# Patient Record
Sex: FEMALE | Race: WHITE | NOT HISPANIC OR LATINO | Employment: FULL TIME | URBAN - METROPOLITAN AREA
[De-identification: names, ages, dates, MRNs, and addresses within clinical notes are randomized per-mention and may not be internally consistent; named-entity substitution may affect disease eponyms.]

---

## 2023-11-18 ENCOUNTER — HOSPITAL ENCOUNTER (EMERGENCY)
Facility: CLINIC | Age: 23
Discharge: HOME | End: 2023-11-19
Attending: EMERGENCY MEDICINE | Admitting: EMERGENCY MEDICINE
Payer: COMMERCIAL

## 2023-11-18 ENCOUNTER — APPOINTMENT (OUTPATIENT)
Dept: ULTRASOUND IMAGING | Facility: CLINIC | Age: 23
End: 2023-11-18
Payer: COMMERCIAL

## 2023-11-18 VITALS
TEMPERATURE: 98.3 F | HEIGHT: 64 IN | DIASTOLIC BLOOD PRESSURE: 77 MMHG | SYSTOLIC BLOOD PRESSURE: 141 MMHG | RESPIRATION RATE: 18 BRPM | OXYGEN SATURATION: 99 % | BODY MASS INDEX: 17.07 KG/M2 | HEART RATE: 97 BPM | WEIGHT: 100 LBS

## 2023-11-18 DIAGNOSIS — N73.0 PID (ACUTE PELVIC INFLAMMATORY DISEASE): ICD-10-CM

## 2023-11-18 DIAGNOSIS — A74.9 POSITIVE CHLAMYIDA TEST: ICD-10-CM

## 2023-11-18 PROBLEM — G89.29 CHRONIC PAIN: Status: ACTIVE | Noted: 2023-11-18

## 2023-11-18 PROBLEM — F11.21 OPIOID DEPENDENCE, IN REMISSION (H): Status: ACTIVE | Noted: 2023-11-18

## 2023-11-18 PROBLEM — F39 MOOD DISORDER (H): Status: ACTIVE | Noted: 2023-11-18

## 2023-11-18 PROBLEM — R11.0 NAUSEA: Status: ACTIVE | Noted: 2023-06-25

## 2023-11-18 PROBLEM — R19.5 LOOSE STOOLS: Status: ACTIVE | Noted: 2023-06-25

## 2023-11-18 PROBLEM — F11.21 OPIOID USE DISORDER, SEVERE, IN EARLY REMISSION (H): Status: ACTIVE | Noted: 2023-11-18

## 2023-11-18 PROBLEM — F11.11 NARCOTIC ABUSE IN REMISSION (H): Status: ACTIVE | Noted: 2023-03-30

## 2023-11-18 PROBLEM — E55.9 VITAMIN D DEFICIENCY, UNSPECIFIED: Status: ACTIVE | Noted: 2023-11-18

## 2023-11-18 LAB
ALBUMIN UR-MCNC: 10 MG/DL
APPEARANCE UR: ABNORMAL
BILIRUB UR QL STRIP: NEGATIVE
COLOR UR AUTO: ABNORMAL
CRP SERPL-MCNC: 115 MG/L
ERYTHROCYTE [DISTWIDTH] IN BLOOD BY AUTOMATED COUNT: 11.2 % (ref 10–15)
GLUCOSE UR STRIP-MCNC: NEGATIVE MG/DL
HCG UR QL: NEGATIVE
HCT VFR BLD AUTO: 37 % (ref 35–47)
HGB BLD-MCNC: 12.3 G/DL (ref 11.7–15.7)
HGB UR QL STRIP: NEGATIVE
KETONES UR STRIP-MCNC: NEGATIVE MG/DL
LEUKOCYTE ESTERASE UR QL STRIP: ABNORMAL
MCH RBC QN AUTO: 30.8 PG (ref 26.5–33)
MCHC RBC AUTO-ENTMCNC: 33.2 G/DL (ref 31.5–36.5)
MCV RBC AUTO: 93 FL (ref 78–100)
MUCOUS THREADS #/AREA URNS LPF: PRESENT /LPF
NITRATE UR QL: NEGATIVE
PH UR STRIP: 6.5 [PH] (ref 5–7)
PLATELET # BLD AUTO: 309 10E3/UL (ref 150–450)
RBC # BLD AUTO: 3.99 10E6/UL (ref 3.8–5.2)
RBC URINE: 1 /HPF
SP GR UR STRIP: 1.02 (ref 1–1.03)
SQUAMOUS EPITHELIAL: 8 /HPF
UROBILINOGEN UR STRIP-MCNC: 2 MG/DL
WBC # BLD AUTO: 9.9 10E3/UL (ref 4–11)
WBC URINE: 27 /HPF

## 2023-11-18 PROCEDURE — 96365 THER/PROPH/DIAG IV INF INIT: CPT

## 2023-11-18 PROCEDURE — 87086 URINE CULTURE/COLONY COUNT: CPT | Performed by: EMERGENCY MEDICINE

## 2023-11-18 PROCEDURE — 99285 EMERGENCY DEPT VISIT HI MDM: CPT | Mod: 25

## 2023-11-18 PROCEDURE — 81001 URINALYSIS AUTO W/SCOPE: CPT | Performed by: EMERGENCY MEDICINE

## 2023-11-18 PROCEDURE — 96375 TX/PRO/DX INJ NEW DRUG ADDON: CPT

## 2023-11-18 PROCEDURE — 36415 COLL VENOUS BLD VENIPUNCTURE: CPT

## 2023-11-18 PROCEDURE — 76856 US EXAM PELVIC COMPLETE: CPT

## 2023-11-18 PROCEDURE — 96361 HYDRATE IV INFUSION ADD-ON: CPT

## 2023-11-18 PROCEDURE — 250N000013 HC RX MED GY IP 250 OP 250 PS 637

## 2023-11-18 PROCEDURE — 250N000011 HC RX IP 250 OP 636: Mod: JZ

## 2023-11-18 PROCEDURE — 86140 C-REACTIVE PROTEIN: CPT

## 2023-11-18 PROCEDURE — 85027 COMPLETE CBC AUTOMATED: CPT

## 2023-11-18 PROCEDURE — 81025 URINE PREGNANCY TEST: CPT

## 2023-11-18 PROCEDURE — 258N000003 HC RX IP 258 OP 636

## 2023-11-18 RX ORDER — KETOROLAC TROMETHAMINE 15 MG/ML
15 INJECTION, SOLUTION INTRAMUSCULAR; INTRAVENOUS ONCE
Status: COMPLETED | OUTPATIENT
Start: 2023-11-18 | End: 2023-11-18

## 2023-11-18 RX ORDER — METRONIDAZOLE 500 MG/1
500 TABLET ORAL 3 TIMES DAILY
Status: COMPLETED | OUTPATIENT
Start: 2023-11-18 | End: 2023-11-18

## 2023-11-18 RX ORDER — DOXYCYCLINE 100 MG/1
100 CAPSULE ORAL 2 TIMES DAILY
Qty: 28 CAPSULE | Refills: 0 | Status: SHIPPED | OUTPATIENT
Start: 2023-11-18 | End: 2023-11-18

## 2023-11-18 RX ORDER — DOXYCYCLINE 100 MG/1
100 CAPSULE ORAL 2 TIMES DAILY
Qty: 27 CAPSULE | Refills: 0 | Status: SHIPPED | OUTPATIENT
Start: 2023-11-18 | End: 2023-11-18

## 2023-11-18 RX ORDER — DOXYCYCLINE 100 MG/1
100 CAPSULE ORAL 2 TIMES DAILY
Qty: 28 CAPSULE | Refills: 0 | Status: SHIPPED | OUTPATIENT
Start: 2023-11-18

## 2023-11-18 RX ORDER — ONDANSETRON 4 MG/1
4 TABLET, ORALLY DISINTEGRATING ORAL EVERY 8 HOURS PRN
Qty: 10 TABLET | Refills: 0 | Status: SHIPPED | OUTPATIENT
Start: 2023-11-18

## 2023-11-18 RX ORDER — METRONIDAZOLE 500 MG/1
500 TABLET ORAL 2 TIMES DAILY
Qty: 14 TABLET | Refills: 1 | Status: SHIPPED | OUTPATIENT
Start: 2023-11-18 | End: 2023-12-02

## 2023-11-18 RX ORDER — DOXYCYCLINE HYCLATE 100 MG
100 TABLET ORAL 2 TIMES DAILY
COMMUNITY
End: 2023-11-18

## 2023-11-18 RX ORDER — DOXYCYCLINE 100 MG/1
100 CAPSULE ORAL EVERY 12 HOURS SCHEDULED
Status: COMPLETED | OUTPATIENT
Start: 2023-11-18 | End: 2023-11-18

## 2023-11-18 RX ORDER — METRONIDAZOLE 500 MG/1
500 TABLET ORAL 2 TIMES DAILY
Qty: 13 TABLET | Refills: 1 | Status: SHIPPED | OUTPATIENT
Start: 2023-11-18 | End: 2023-11-18

## 2023-11-18 RX ORDER — BUPRENORPHINE HYDROCHLORIDE AND NALOXONE HYDROCHLORIDE DIHYDRATE 8; 2 MG/1; MG/1
1 TABLET SUBLINGUAL DAILY
COMMUNITY

## 2023-11-18 RX ORDER — CEFTRIAXONE 1 G/1
1 INJECTION, POWDER, FOR SOLUTION INTRAMUSCULAR; INTRAVENOUS ONCE
Status: COMPLETED | OUTPATIENT
Start: 2023-11-18 | End: 2023-11-18

## 2023-11-18 RX ORDER — ONDANSETRON 4 MG/1
4 TABLET, ORALLY DISINTEGRATING ORAL EVERY 8 HOURS PRN
Qty: 10 TABLET | Refills: 0 | Status: SHIPPED | OUTPATIENT
Start: 2023-11-18 | End: 2023-11-18

## 2023-11-18 RX ADMIN — CEFTRIAXONE 1 G: 1 INJECTION, POWDER, FOR SOLUTION INTRAMUSCULAR; INTRAVENOUS at 23:15

## 2023-11-18 RX ADMIN — DOXYCYCLINE HYCLATE 100 MG: 100 CAPSULE ORAL at 22:19

## 2023-11-18 RX ADMIN — KETOROLAC TROMETHAMINE 15 MG: 15 INJECTION, SOLUTION INTRAMUSCULAR; INTRAVENOUS at 22:19

## 2023-11-18 RX ADMIN — SODIUM CHLORIDE, POTASSIUM CHLORIDE, SODIUM LACTATE AND CALCIUM CHLORIDE 1000 ML: 600; 310; 30; 20 INJECTION, SOLUTION INTRAVENOUS at 22:18

## 2023-11-18 RX ADMIN — METRONIDAZOLE 500 MG: 500 TABLET ORAL at 22:19

## 2023-11-18 ASSESSMENT — ACTIVITIES OF DAILY LIVING (ADL)
ADLS_ACUITY_SCORE: 35
ADLS_ACUITY_SCORE: 35

## 2023-11-18 NOTE — Clinical Note
Davis Duncan was seen and treated in our emergency department on 11/18/2023.  She may return to work on 11/23/2023.       If you have any questions or concerns, please don't hesitate to call.      Elsie Bassett PA-C

## 2023-11-19 NOTE — ED PROVIDER NOTES
Emergency Department Encounter   11/18/2023  8:03 PM    CHIEF COMPLAINT: Back Pain and Abdominal Pain      IMPRESSION AND PLAN   MEDICAL DECISION MAKING: Davis Duncan is a 23 year old female presenting to the ED with abdominal pain, pelvic pain, and back pain.  Patient was diagnosed with chlamydia and bacterial vaginosis yesterday when she was seen by an OB/GYN.  Patient suspects pema chlamydia in October when she started seeing a new partner.  Patient has received IM Rocephin injection and was prescribed doxycycline and metronidazole gel.  Patient has not picked up prescriptions at pharmacy yet and has not started doxycycline or metronidazole.  Symptoms have gotten worse since yesterday and she now has increasing pain in her lower left quadrant of her abdomen as well as her back.  Patient endorses fevers and chills as well as 1 episode of nausea vomiting.    Vitals reviewed and stable, afebrile at 98.3  F without fever reducing medications. On exam patient is well-appearing in no acute distress.  Abdomen is tender to palpation of the left lower quadrant.  No rebound or guarding to suggest acute abdomen.  No CVA tenderness.      Patient's symptoms and presentation consistent with pelvic inflammatory disease.  I did not do a pelvic exam looking for cervical motion, uterine, and or adnexal tenderness given her recent pelvic exam yesterday done by an OB/GYN who diagnosed pelvic inflammatory disease.  Transvaginal ultrasound was concerning for tubo-ovarian abscess.  I spoke with on-call OB/GYN, Dr. Martinez Justice, who is not convinced of TOA given radiologist read.  Per MD, complex tubular structures with echogenic material on transvaginal ultrasound are not large enough to be considered tubo-ovarian abscesses.  OB/GYN is not concerned with elevated CRP and recommends outpatient treatment with 14 day course of pelvic inflammatory disease antibiotics. Presentation is not consistent with cystitis, pyelonephritis,  herpes, syphilis, deep space  infection, or acute abdomen.    I have instructed the patient to return to the ED at any time if there are any new or worsening symptoms.  The patient expressed understanding of and agreement with this plan.  I stressed the importance of antibiotic use and educated patient on possible complications of not taking antibiotics including infertility and it intra-abdominal infection.  Higher to discharge, patient was given first-time dose of doxycycline and metronidazole.  I also gave 1 g IV ceftriaxone given ultrasound findings.      Medical Decision Making    History:  Supplemental history from: Documented in chart, if applicable and Family Member/Significant Other  External Record(s) reviewed: Documented in chart, if applicable.    Work Up:  Chart documentation includes differential considered and any EKGs or imaging independently interpreted by provider, where specified.  In additional to work up documented, I considered the following work up: Documented in chart, if applicable.    External consultation:  Discussion of management with another provider: Documented in chart, if applicable    Complicating factors:  Care impacted by chronic illness: N/A  Care affected by social determinants of health: Alcohol Abuse and/or Recreational Drug Use    Disposition considerations: Discharge. I prescribed additional prescription strength medication(s) as charted. See documentation for any additional details.      EMERGENCY DEPARTMENT COURSE:  8:55 PM I met and introduced myself to the patient. I gathered initial history and performed my physical exam. We discussed plan for initial workup.   9:00 PM I have staffed the patient with Dr. Bertha Aguirre, ED MD, who has evaluated the patient and agrees with all aspects of today's care.   10:00 PM I reassessed the patient and discussed my concern for pelvic inflammatory disease.  We discussed possibly doing a pelvic exam but that my suspicion for pelvic  inflammatory disease is high enough where it is not required.  I am also reassured that patient received a pelvic exam yesterday at OB/GYN office.  Patient elected not to have an additional pelvic exam done today.  10:37 PM I discussed ultrasound results with patient and the significance of a tubo-ovarian abscess.  I recommend admission and explained the risks of outpatient management of her leaving AGAINST MEDICAL ADVICE.  Patient is amenable to admission.  OB/GYN paged.  10:45 PM I spoke with Dr. Martinez Justice (OBGYN) who recommends outpatient treatment with 14 day course of pelvic inflammatory disease antibiotics.  11:00 PM I rechecked the patient and discussed results, discharge, follow up, and reasons to return to the ED.     At the conclusion of the encounter I discussed the results of all the tests and the disposition. The questions were answered. The patient or family acknowledged understanding and was agreeable with the care plan.    FINAL IMPRESSION:    ICD-10-CM    1. PID (acute pelvic inflammatory disease)  N73.0       2. Positive Chlamyida test  A74.9             MEDICATIONS GIVEN IN THE EMERGENCY DEPARTMENT:  Medications   cefTRIAXone (ROCEPHIN) 1 g vial to attach to  mL bag for ADULTS or NS 50 mL bag for PEDS (has no administration in time range)   lactated ringers BOLUS 1,000 mL (1,000 mLs Intravenous $New Bag 11/18/23 2218)   ketorolac (TORADOL) injection 15 mg (15 mg Intravenous $Given 11/18/23 2219)   doxycycline hyclate (VIBRAMYCIN) capsule 100 mg (100 mg Oral $Given 11/18/23 2219)   metroNIDAZOLE (FLAGYL) tablet 500 mg (500 mg Oral $Given 11/18/23 2219)         NEW PRESCRIPTIONS STARTED AT TODAY'S ED VISIT:  New Prescriptions    DOXYCYCLINE HYCLATE (VIBRAMYCIN) 100 MG CAPSULE    Take 1 capsule (100 mg) by mouth 2 times daily    METRONIDAZOLE (FLAGYL) 500 MG TABLET    Take 1 tablet (500 mg) by mouth 2 times daily for 14 days    ONDANSETRON (ZOFRAN ODT) 4 MG ODT TAB    Take 1 tablet (4 mg) by  mouth every 8 hours as needed for nausea         HISTORY OF PRESENT ILLNESS   Patient information was obtained from: patient   Use of Intrepreter: N/A     Davis Duncan is a 23 year old female with a pertinent history of mood disorder and narcotic abuse in remission who presents to the ED by 5 vehicle for evaluation of back pain and abdominal pain.    Patient was seen by OB/GYN yesterday at [facility] obstetrics and gynecology for vaginal discharge and pelvic pain.  Patient tested positive for chlamydia and bacterial vaginosis. Patient believes she acquired chlamydia infection in October when she started seeing a new partner.  She was asymptomatic until a few days ago. She was given ceftriaxone 500 mg injection yesterday and was given Rx for doxycycline and metronidazole gel.  Today patient reports that her abdominal pain is getting worse and now has spread to her back.  She is also now having mild vaginal bleeding.  Pain was so bad she vomited once.  Patient has not picking up her prescription for doxycycline or the metronidazole gel.  Patient reports chills and fever at home, afebrile in triage but patient reports temperatures up to 100.8 F at home.  1 episode of vomiting and nausea.  No chest pain, shortness of breath, diarrhea, or constipation.    MEDICAL HISTORY     No past medical history on file.    No past surgical history on file.    No family history on file.    Social History     Substance Use Topics    Alcohol use: No    Drug use: No       buprenorphine-naloxone (SUBOXONE) 8-2 MG SUBL sublingual tablet  cefTRIAXone (ROCEPHIN) 500 mg  doxycycline hyclate (VIBRA-TABS) 100 MG tablet  doxycycline hyclate (VIBRAMYCIN) 100 MG capsule  levonorgestrel (MIRENA) 52 MG (20 mcg/day) IUD  metroNIDAZOLE (FLAGYL) 500 MG tablet  ondansetron (ZOFRAN ODT) 4 MG ODT tab          PHYSICAL EXAM     Vitals:  Patient Vitals for the past 24 hrs:   BP Temp Temp src Pulse Resp SpO2 Height Weight   11/18/23 1959 (!) 141/77 98.3  F  "(36.8  C) Temporal 97 18 99 % 1.626 m (5' 4\") 45.4 kg (100 lb)         PHYSICAL EXAM:   CONSTITUTIONAL: Generally well-appearing female , in no acute distress. Well developed, nourished.  EYES: Conjunctivae clear, no scleral icterus,  EOM intact.  HENT:  Normocephalic, atraumatic. Normal neck range of motion, supple. Moist mucous membranes.   RESPIRATORY:  Respirations even, unlabored, in no acute respiratory distress.  CARDIOVASCULAR:  Regular rate and rhythm, good peripheral perfusion.  GI: Soft, nondistended. No palpable masses. No rebound tenderness or guarding.  Left lower quadrant abdominal pain, tender to palpation.  No CVA tenderness.  PELVIC Deferred given pelvic exam performed yesterday by OB/GYN which was congruent with diagnosis of PID.  MSK:  No edema. No cyanosis. Range of motion of major extremities intact.  INTEGUMENT: Warm, dry, No erythema. No rash.   NEURO: AxOx4. CN II-XII grossly intact, but not individually tested. No focal neurological deficits.   PSYCH: Thoughts linear and responses appropriate. Cooperative. Appropriate mood and affect.       RESULTS     LAB:  All pertinent labs reviewed and interpreted  Labs Ordered and Resulted from Time of ED Arrival to Time of ED Departure   ROUTINE UA WITH MICROSCOPIC REFLEX TO CULTURE - Abnormal       Result Value    Color Urine Light Yellow      Appearance Urine Turbid (*)     Glucose Urine Negative      Bilirubin Urine Negative      Ketones Urine Negative      Specific Gravity Urine 1.017      Blood Urine Negative      pH Urine 6.5      Protein Albumin Urine 10 (*)     Urobilinogen Urine 2.0 (*)     Nitrite Urine Negative      Leukocyte Esterase Urine 75 Catherine/uL (*)     Mucus Urine Present (*)     RBC Urine 1      WBC Urine 27 (*)     Squamous Epithelials Urine 8 (*)    CRP INFLAMMATION - Abnormal    CRP Inflammation 115.00 (*)    HCG QUALITATIVE URINE - Normal    hCG Urine Qualitative Negative     CBC WITH PLATELETS - Normal    WBC Count 9.9      RBC " Count 3.99      Hemoglobin 12.3      Hematocrit 37.0      MCV 93      MCH 30.8      MCHC 33.2      RDW 11.2      Platelet Count 309     URINE CULTURE       RADIOLOGY:  US Pelvis Cmplt w Transvag & Doppler LmtPel Duplex Limited   Final Result   IMPRESSION:     1.  Complex tubular structures both adnexa with echogenic material concerning for tubo-ovarian abscess.   2.  Dominant follicle left ovary.    3.  IUD in the uterus.                   Elsie Bassett PA-C   Emergency Medicine   [facility] EMERGENCY ROOM       Elsie Bassett PA-C  11/18/23 [id number]

## 2023-11-19 NOTE — DISCHARGE INSTRUCTIONS
Today you were seen in the emergency department for pelvic pain, abdominal pain, and back pain.  You recently tested positive for chlamydia but have only received the Rocephin (antibiotic) injection so far.  This antibiotic is not enough to clear chlamydia infection.  I anticipate your symptoms today are due to a worsening chlamydia infection that is led to pelvic inflammatory disease.    Your transvaginal ultrasound was concerning for tubo-ovarian abscess. The on call OB/GYN did not believe that the tubular structures seen on ultrasound were large enough to be considered an abscess and therefore she did not recommend hospital admission and instead recommends 14 days of outpatient antibiotic treatment.    You have received your first dose of doxycycline and metronidazole today in the emergency department.  I have provided prescriptions for 14-day courses of both of these medications.  I have also provided a prescription for antinausea medication.  It is important that you follow-up with OB/GYN to assess resolution of this infection.    Please complete these antibiotics as instructed and do not stop taking them just because you feel better.  It is also important that you follow-up with OB/GYN within a week for assessment of resolution of symptoms.     Call 911  anytime you think you may need emergency care. For example, call if:    You passed out (lost consciousness).   Call your doctor now or seek immediate medical care if:    You have a new or higher fever.     You have unusual vaginal bleeding.     You have new or worse belly or pelvic pain.     You have vaginal discharge that has increased in amount or smells bad.     You are dizzy or lightheaded, or you feel like you may faint.     You have symptoms of sepsis, such as:  Shortness of breath.  Feeling very sick.  Severe pain.  A fast heart rate.  Cool, pale, or clammy skin.  Feeling confused.  Feeling very sleepy, or you are hard to wake up.   Watch closely for  changes in your health, and be sure to contact your doctor if:    You do not get better as expected.

## 2023-11-19 NOTE — ED PROVIDER NOTES
"EMERGENCY DEPARTMENT MIDLEVEL SUPERVISORY NOTE    Date/Time: 11/18/2023 8:45 PM    I am seeing this patient along with Elsie Bassett PA-C. I have seen and evaluated the patient independently at bedside and agree with the LOU's history, assessment and plan.  I personally saw the patient and performed a substantive portion of the visit including all aspects of the medical decision making.      BRIEF HPI    Davis Duncan is a 23 year old female with a pertinent history of mood disorder and narcotic abuse in remission who presents to the ED by 5 vehicle for evaluation of back pain and abdominal pain.     Patient was seen by OB/GYN yesterday at [facility] obstetrics and gynecology for vaginal discharge and pelvic pain.  Patient tested positive for chlamydia and bacterial vaginosis. Patient believes she acquired chlamydia infection in October when she started seeing a new partner.  She was asymptomatic until a few days ago. She was given ceftriaxone 500 mg injection yesterday and was given Rx for doxycycline and metronidazole gel.  Today patient reports that her abdominal pain is getting worse and now has spread to her back.  She is also now having mild vaginal bleeding.  Pain was so bad she vomited once.  Patient has not picking up her prescription for doxycycline or the metronidazole gel.  Patient reports chills and fever at home, afebrile in triage but patient reports temperatures up to 100.8 F at home.  1 episode of vomiting and nausea.  No chest pain, shortness of breath, diarrhea, or constipation.    BRIEF PHYSICAL EXAM  Vitals: BP (!) 141/77   Pulse 97   Temp 98.3  F (36.8  C) (Temporal)   Resp 18   Ht 1.626 m (5' 4\")   Wt 45.4 kg (100 lb)   SpO2 99%   BMI 17.16 kg/m    General: Appears in no acute distress, awake, alert, interactive.  Eyes: Conjunctivae clear.   HENT: Atraumatic. MMM.   Neck: Full AROM.  Cardiovascular: Regular rate and rhythm.  Respiratory/Chest: Normal work of breathing. Speaking in " full sentences.  Abdomen: Non-distended.  Musculoskeletal: Normal appearing extremities without obvious deformities or signs of trauma. No edema or erythema.  Skin: Normal color. No visible rash or diaphoresis.  Neurologic: Alert. Face symmetric, moves all extremities spontaneously. Speech clear.  Psychiatric: Affect appropriate, cooperative.    ED COURSE  9:00 PM I received the patient report from the LOU, TONI Solis. I agree with their assessment and plan of management, and I will see the patient.  10:45 PM I met with the patient to introduce myself, gather additional history, perform my initial exam, and discuss the plan.     MEDICAL DECISION MAKING    Pertinent Labs and Imaging reviewed (see chart for details)    Davis Duncan is a 23 year old year old who presents for evaluation of uterine and back pain.  Stable and reassuring.  Patient was seen in OB/GYN clinic yesterday and diagnosed with chlamydia and BV.  She was given IM Rocephin and discharged with a prescription for metronidazole gel and doxycycline but reports that she has not yet been able to pick these up.  Today, she presents with worsening abdominal and pelvic pain as well as associated back pain.  She reports fevers and chills at home and had 1 episode of emesis.    Considered a broad differential including but not limited to PID, TOA, STI, vaginal infection, sepsis/bacteremia, electrolyte derangement, acute kidney injury.  Orders placed for labs, UA, pregnancy, pelvic ultrasound.  Will manage symptoms with IV fluids, Toradol, and give dose of antibiotics.    Ultrasound showed evidence of possible TOA bilaterally.  UA notable for WBCs but may be contaminated given presence of squamous epithelial cells.  No clear evidence of infection.  CRP elevated at 115, consistent with infectious/inflammatory process.  CBC without leukocytosis.  PA discussed the case with OB/GYN who reviewed results of ultrasound and did not feel it was necessarily  convincing for TOA given size of structures.  They recommended outpatient management with 14-day course of antibiotics.  Patient was given IV ceftriaxone here and discharged with new prescriptions.  She felt comfortable with plan for outpatient management of symptoms and close follow-up with OB/GYN.    At conclusion of encounter I discussed results of all of tests and recommendation for disposition. All questions were answered. Patient acknowledged understanding and was agreeable with care plan.     Please see midlevel note for additional details. I personally saw the patient and performed a substantive portion of the visit including all aspects of the medical decision making.     FINAL IMPRESSION       PID (acute pelvic inflammatory disease)  Positive Chlamyida test        I, Anibal Alves, am serving as a scribe to document services personally performed by Bertha Aguirre MD, based on my observations and the provider's statements to me. I, Bertha Aguirre MD, attest that Anibal Alves is acting in a scribe capacity, has observed my performance of the services and has documented them in accordance with my direction.     Bertha Aguirre MD  Emergency Medicine  11/18/2023 8:45 PM     Bertha Aguirre MD  11/18/23 [id number]

## 2023-11-19 NOTE — ED TRIAGE NOTES
"Pt is coming in tonight with uterine and back pain that started \"about a week ago\". Pt tested positive for chlamydia on Thursday and had an ABX injection yesterday. Took tylenol last around 1500. Is having mild spotting as well.      Triage Assessment (Adult)       Row Name 11/2000          Triage Assessment    Airway WDL WDL        Respiratory WDL    Respiratory WDL WDL        Skin Circulation/Temperature WDL    Skin Circulation/Temperature WDL WDL        Cardiac WDL    Cardiac WDL WDL        Peripheral/Neurovascular WDL    Peripheral Neurovascular WDL WDL        Cognitive/Neuro/Behavioral WDL    Cognitive/Neuro/Behavioral WDL WDL                     "

## 2023-11-19 NOTE — PHARMACY-ADMISSION MEDICATION HISTORY
Pharmacist Admission Medication History    Admission medication history is complete. The information provided in this note is only as accurate as the sources available at the time of the update.    Information Source(s): Patient, Patient's pharmacy, and Clinic records via in-person    Pertinent Information: None    Changes made to PTA medication list:  Added: Doxycycline   Deleted: None  Changed: None    Medication Affordability:       Allergies reviewed with patient and updates made in EHR: yes    Medication History Completed By: Zachary Hamilton [facility] 11/18/2023 10:40 PM    PTA Med List   Medication Sig Note Last Dose    buprenorphine-naloxone (SUBOXONE) 8-2 MG SUBL sublingual tablet Place 1 tablet under the tongue daily  11/18/2023 at 0800    cefTRIAXone (ROCEPHIN) 500 mg Inject 500 mg into the muscle once  11/17/2023 at x 1 dose in clinic    doxycycline hyclate (VIBRA-TABS) 100 MG tablet Take 100 mg by mouth 2 times daily 11/18/2023: New Rx, hasn't started yet      levonorgestrel (MIRENA) 52 MG (20 mcg/day) IUD 1 each by Intrauterine route once

## 2023-11-20 LAB — BACTERIA UR CULT: NO GROWTH

## 2023-11-20 NOTE — RESULT ENCOUNTER NOTE
"Final urine culture report shows \"NO GROWTH\" and is NEGATIVE.  [facility] Emergency Dept discharge antibiotic: Doxycycline and Flagyl  Recommendations in treatment per [facility] ED Lab result Urine culture protocol.  No change in treatment  "

## 2023-12-04 ENCOUNTER — NURSE TRIAGE (OUTPATIENT)
Dept: NURSING | Facility: CLINIC | Age: 23
End: 2023-12-04
Payer: COMMERCIAL

## 2023-12-04 ENCOUNTER — HOSPITAL ENCOUNTER (EMERGENCY)
Facility: CLINIC | Age: 23
Discharge: HOME | End: 2023-12-04
Attending: EMERGENCY MEDICINE | Admitting: EMERGENCY MEDICINE
Payer: COMMERCIAL

## 2023-12-04 VITALS
OXYGEN SATURATION: 99 % | TEMPERATURE: 98.3 F | WEIGHT: 100 LBS | RESPIRATION RATE: 16 BRPM | SYSTOLIC BLOOD PRESSURE: 120 MMHG | BODY MASS INDEX: 17.16 KG/M2 | DIASTOLIC BLOOD PRESSURE: 72 MMHG | HEART RATE: 91 BPM

## 2023-12-04 DIAGNOSIS — R10.32 LLQ ABDOMINAL PAIN: ICD-10-CM

## 2023-12-04 DIAGNOSIS — Z11.3 ROUTINE SCREENING FOR STI (SEXUALLY TRANSMITTED INFECTION): ICD-10-CM

## 2023-12-04 LAB
CLUE CELLS: ABNORMAL
TRICHOMONAS, WET PREP: ABNORMAL
WBC'S/HIGH POWER FIELD, WET PREP: ABNORMAL
YEAST, WET PREP: ABNORMAL

## 2023-12-04 PROCEDURE — 87591 N.GONORRHOEAE DNA AMP PROB: CPT | Performed by: EMERGENCY MEDICINE

## 2023-12-04 PROCEDURE — 87210 SMEAR WET MOUNT SALINE/INK: CPT | Performed by: EMERGENCY MEDICINE

## 2023-12-04 PROCEDURE — 87491 CHLMYD TRACH DNA AMP PROBE: CPT | Performed by: EMERGENCY MEDICINE

## 2023-12-04 PROCEDURE — 99283 EMERGENCY DEPT VISIT LOW MDM: CPT

## 2023-12-04 NOTE — TELEPHONE ENCOUNTER
Reason for Disposition   Finished taking antibiotics and symptoms are BETTER but are not completely gone    Additional Information   Negative: SEVERE difficulty breathing (e.g., struggling for each breath, speaks in single words)   Negative: Sounds like a life-threatening emergency to the triager   Negative: Sinus infection and taking an antibiotic   Negative: Wound infection and taking an antibiotic   Negative: MODERATE difficulty breathing (e.g., speaks in phrases, SOB even at rest, pulse 100-120)   Negative: Fever > 103 F  (39.4 C)   Negative: Patient sounds very sick or weak to the triager    Protocols used: Infection on Antibiotic Follow-up Call-A-[place]  The patient was seen in the ED on 11/18/23 and diagnosed with Pelvic inflammatory disease caused by chlamydia infection  The patient was prescribed metronidazole and doxycycline for 14 days  The patient was advised to follow up for ultrasound due to a cyst on her left ovary thought to be caused by the infections  The patient reports she continues to have some cramping on the left lower abdominal area that is mild and much improved prior to completed the antibiotic one day ago on 12/3/23.  Triage guidelines recommend to see in office today or tomorrow  Caller verbalized and understands directives

## 2023-12-04 NOTE — ED TRIAGE NOTES
Pt seen here 2 weeks ago and placed on Doxy and flagyl and has finished course.  Here to get re-check.  Feeling better.   Triage Assessment (Adult)       Row Name 12/04/23 [id number]          Triage Assessment    Airway WDL WDL        Respiratory WDL    Respiratory WDL WDL        Skin Circulation/Temperature WDL    Skin Circulation/Temperature WDL WDL        Cardiac WDL    Cardiac WDL WDL        Peripheral/Neurovascular WDL    Peripheral Neurovascular WDL WDL        Cognitive/Neuro/Behavioral WDL    Cognitive/Neuro/Behavioral WDL WDL

## 2023-12-05 LAB
C TRACH DNA SPEC QL NAA+PROBE: POSITIVE
N GONORRHOEA DNA SPEC QL NAA+PROBE: NEGATIVE

## 2023-12-05 RX ORDER — DOXYCYCLINE 100 MG/1
100 CAPSULE ORAL 2 TIMES DAILY
Qty: 28 CAPSULE | Refills: 0 | Status: SHIPPED | OUTPATIENT
Start: 2023-12-05 | End: 2023-12-19

## 2023-12-05 NOTE — DISCHARGE INSTRUCTIONS
We recent swabs today for gonorrhea, chlamydia as well as bacterial vaginosis, trichomonas and yeast infection.  These are pending.  We will call you if these are abnormal to start treatment.  You declined repeat ultrasound to evaluate the tubo-ovarian abscess today.  Would recommend you follow-up with OB/GYN as an outpatient to have repeat ultrasound.  Referral provided.

## 2023-12-05 NOTE — ED PROVIDER NOTES
EMERGENCY DEPARTMENT ENCOUNTER      NAME: Davis Duncan  AGE: 23 year old female  YOB: 2000  MRN: [id number]  EVALUATION DATE & TIME: No admission date for patient encounter.    PCP: Janell Man    ED PROVIDER: Carole Casey MD    Chief Complaint   Patient presents with    Abdominal Pain         FINAL IMPRESSION:  1. LLQ abdominal pain    2. Routine screening for STI (sexually transmitted infection)          ED COURSE & MEDICAL DECISION MAKING:    Pertinent Labs & Imaging studies reviewed. (See chart for details)  23 year old female with history of mood disorder, opioid dependence on Suboxone who presents to the Emergency Department fo PID and chlamydia.  Had ultrasound with questionable tubo-ovarian abscess with dilated r evaluation of recheck after she was seen in her ED on 11/18/2023 and diagnosed with fallopian tubes.  She has finished her antibiotics as prescribed, and is feeling markedly improved and is here to be rechecked.  Wants to make sure that her GC/chlamydia/BV is resolved.  She does have some residual left lower quadrant tenderness palpation on examination.  Wet prep sent, negative.  GC and chlamydia sent and is pending at time of dictation.  Recommend repeat ultrasound of the pelvis to evaluate for TOA.  Patient declines because she has to get to work.  Given outpatient OB/GYN referral for follow-up.    6:03 PM Met with the patient and performed my initial exam.         Medical Decision Making    History:  Supplemental history from: Spouse at bedside  External Record(s) reviewed: Inpatient Record: ED visit to [facility] Emergency Room for back pain and abdominal pain on 11/18/2023 - 11/19/2023    Work Up:  Chart documentation includes differential considered and any EKGs or imaging independently interpreted by provider, see MDM  In additional to work up documented, I considered the following work up: see MDM    External consultation:  Discussion of  management with another provider: N/A    Complicating factors:  Care impacted by chronic illness: Chronic Pain and Mental Health  Care affected by social determinants of health: Access to Affordable Health Care    Disposition considerations: Discharge. No recommendations on prescription strength medication(s). See documentation for any additional details.        At the conclusion of the encounter I discussed the results of all of the tests and the disposition. The questions were answered. The patient or family acknowledged understanding and was agreeable with the care plan.      MEDICATIONS GIVEN IN THE EMERGENCY:  Medications - No data to display    NEW PRESCRIPTIONS STARTED AT TODAY'S ER VISIT  Discharge Medication List as of 12/4/2023  6:30 PM             =================================================================    HPI    Patient information was obtained from: Patient    Use of Intrepreter: N/A       Davis Duncan is a 23 year old female with pertinent medical history of opioid use disorder, chronic pain, and mood disorder, who presents to the ED for evlaution of abdominal pain.    Patient reports that was on placed on Doxy and flagyl and has finished course and is here for a recheck. She notes she still has abscess is concerned about it. Endorses dysuria, but reports that pain has subsided since 2 weeks ago. She hasn't seen an OB/Gyn as an outpatient yet. No other reported complaints or concerns at this time.    Per chart review, the patient presented to [facility] Emergency Room for back pain and abdominal pain on 11/18/2023 - 11/19/2023. No pelvic exam was done at ED visit. Transvaginal ultrasound was done and OB/GYN was consulted. OB/GYN was not convinced of TOA given radiologist read. Per MD, complex tubular structures with echogenic material on transvaginal ultrasound are not large enough to be considered tubo-ovarian abscesses.  OB/GYN was not concerned with elevated  CRP and recommended outpatient treatment with 14 day course of pelvic inflammatory disease antibiotics. Patient was started on 100 mg of vibramycin, 500 mg of Flagyl, and 4 mg of Zofran upon discharge.    PAST MEDICAL HISTORY:  No past medical history on file.    PAST SURGICAL HISTORY:  No past surgical history on file.    CURRENT MEDICATIONS:    Prior to Admission Medications   Prescriptions Last Dose Informant Patient Reported? Taking?   buprenorphine-naloxone (SUBOXONE) 8-2 MG SUBL sublingual tablet   Yes No   Sig: Place 1 tablet under the tongue daily   doxycycline hyclate (VIBRAMYCIN) 100 MG capsule   No No   Sig: Take 1 capsule (100 mg) by mouth 2 times daily   levonorgestrel (MIRENA) 52 MG (20 mcg/day) IUD   Yes No   Si each by Intrauterine route once   ondansetron (ZOFRAN ODT) 4 MG ODT tab   No No   Sig: Take 1 tablet (4 mg) by mouth every 8 hours as needed for nausea      Facility-Administered Medications: None       ALLERGIES:  Allergies   Allergen Reactions    Hydrocodone Other (See Comments)     Avoid using narcotics. Taking Naloxone, hx of opioid abuse    Oxycodone Other (See Comments)     Avoid using narcotics. Taking Naloxone, hx of opioid abuse.       FAMILY HISTORY:  No family history on file.    SOCIAL HISTORY:  Social History     Substance Use Topics    Alcohol use: No    Drug use: No        VITALS:  Patient Vitals for the past 24 hrs:   BP Temp Temp src Pulse Resp SpO2 Weight   23 1752 120/72 98.3  F (36.8  C) Oral 91 16 99 % 45.4 kg (100 lb)       PHYSICAL EXAM    General Appearance: Well-appearing, well-nourished, no acute distress.  Cardio:  Regular rate and rhythm  Pulm:  No respiratory distress  Back:  No CVA tenderness, normal ROM  Abdomen:  Soft, mild left lower quadrant tenderness palpation, non distended,no rebound or guarding.  Extremities: Normal gait  Skin:  Skin warm, dry, no rashes  Neuro:  Alert and oriented ×3     RADIOLOGY/LABS:  Reviewed all pertinent imaging. Please  see official radiology report. All pertinent labs reviewed and interpreted.    Results for orders placed or performed during the hospital encounter of 12/04/23   Wet prep    Specimen: Vagina; Swab   Result Value Ref Range    Trichomonas Absent Absent    Yeast Absent Absent    Clue Cells Absent Absent    WBCs/high power field 1+ (A) None         The creation of this record is based on the scribe s observations of the work being performed by Carole Casey MD and the provider s statements to them. It was created on her behalf by Mila Maradiaga, a trained medical scribe. This document has been checked and approved by the attending provider.    Carole Casey MD  Emergency Medicine  [facility] EMERGENCY ROOM  [address] [place] [id number] [phone]  Dept: [phone]     Carole Casey MD  12/04/23 [id number]

## 2023-12-12 ENCOUNTER — LAB REQUISITION (OUTPATIENT)
Dept: LAB | Facility: CLINIC | Age: 23
End: 2023-12-12

## 2023-12-12 DIAGNOSIS — Z11.3 ENCOUNTER FOR SCREENING FOR INFECTIONS WITH A PREDOMINANTLY SEXUAL MODE OF TRANSMISSION: ICD-10-CM

## 2023-12-12 PROCEDURE — 87491 CHLMYD TRACH DNA AMP PROBE: CPT | Performed by: OBSTETRICS & GYNECOLOGY

## 2023-12-12 PROCEDURE — 87591 N.GONORRHOEAE DNA AMP PROB: CPT | Performed by: OBSTETRICS & GYNECOLOGY

## 2023-12-13 LAB
C TRACH DNA SPEC QL PROBE+SIG AMP: NEGATIVE
N GONORRHOEA DNA SPEC QL NAA+PROBE: NEGATIVE

## 2023-12-24 ENCOUNTER — HEALTH MAINTENANCE LETTER (OUTPATIENT)
Age: 23
End: 2023-12-24

## 2025-01-18 ENCOUNTER — HEALTH MAINTENANCE LETTER (OUTPATIENT)
Age: 25
End: 2025-01-18